# Patient Record
Sex: MALE | Race: AMERICAN INDIAN OR ALASKA NATIVE
[De-identification: names, ages, dates, MRNs, and addresses within clinical notes are randomized per-mention and may not be internally consistent; named-entity substitution may affect disease eponyms.]

---

## 2019-02-19 ENCOUNTER — HOSPITAL ENCOUNTER (EMERGENCY)
Dept: HOSPITAL 42 - ED | Age: 44
Discharge: LEFT BEFORE DISCHARGE | End: 2019-02-19
Payer: MEDICAID

## 2019-02-19 VITALS — BODY MASS INDEX: 29 KG/M2

## 2019-02-19 VITALS — HEART RATE: 72 BPM | SYSTOLIC BLOOD PRESSURE: 160 MMHG | DIASTOLIC BLOOD PRESSURE: 100 MMHG

## 2019-02-19 VITALS — RESPIRATION RATE: 18 BRPM | TEMPERATURE: 97.9 F | OXYGEN SATURATION: 100 %

## 2019-02-19 DIAGNOSIS — Z87.891: ICD-10-CM

## 2019-02-19 DIAGNOSIS — Z91.15: ICD-10-CM

## 2019-02-19 DIAGNOSIS — N18.6: ICD-10-CM

## 2019-02-19 DIAGNOSIS — Z99.2: ICD-10-CM

## 2019-02-19 DIAGNOSIS — I12.0: Primary | ICD-10-CM

## 2019-02-19 LAB
ALBUMIN SERPL-MCNC: 4.1 G/DL (ref 3–4.8)
ALBUMIN/GLOB SERPL: 1.6 {RATIO} (ref 1.1–1.8)
ALT SERPL-CCNC: 15 U/L (ref 7–56)
AST SERPL-CCNC: 18 U/L (ref 17–59)
BASOPHILS # BLD AUTO: 0.04 K/MM3 (ref 0–2)
BASOPHILS NFR BLD: 0.5 % (ref 0–3)
BUN SERPL-MCNC: 122 MG/DL (ref 7–21)
CALCIUM SERPL-MCNC: 9.2 MG/DL (ref 8.4–10.5)
EOSINOPHIL # BLD: 0.3 10*3/UL (ref 0–0.7)
EOSINOPHIL NFR BLD: 3.5 % (ref 1.5–5)
ERYTHROCYTE [DISTWIDTH] IN BLOOD BY AUTOMATED COUNT: 14.5 % (ref 11.5–14.5)
GFR NON-AFRICAN AMERICAN: 3
HGB BLD-MCNC: 9.2 G/DL (ref 14–18)
LYMPHOCYTES # BLD: 1.3 10*3/UL (ref 1.2–3.4)
LYMPHOCYTES NFR BLD AUTO: 14.2 % (ref 22–35)
MCH RBC QN AUTO: 30.3 PG (ref 25–35)
MCHC RBC AUTO-ENTMCNC: 32.9 G/DL (ref 31–37)
MCV RBC AUTO: 92.1 FL (ref 80–105)
MONOCYTES # BLD AUTO: 0.4 10*3/UL (ref 0.1–0.6)
MONOCYTES NFR BLD: 4.1 % (ref 1–6)
PLATELET # BLD: 318 10^3/UL (ref 120–450)
PMV BLD AUTO: 9.2 FL (ref 7–11)
RBC # BLD AUTO: 3.04 10^6/UL (ref 3.5–6.1)
WBC # BLD AUTO: 8.9 10^3/UL (ref 4.5–11)

## 2019-02-19 NOTE — RAD
Date of service: 



02/19/2019



HISTORY:

 r/o CHF 



COMPARISON:

No prior. 



FINDINGS:



LUNGS:

No active pulmonary disease.



PLEURA:

No significant pleural effusion identified, no pneumothorax apparent.



CARDIOVASCULAR:

No atherosclerotic calcification present



Cardiomegaly.  No evidence of acute, significant cardiovascular 

disease. 



OSSEOUS STRUCTURES:

No significant abnormalities.



VISUALIZED UPPER ABDOMEN:

Normal.



OTHER FINDINGS:

None.



IMPRESSION:

No active disease.

## 2019-02-19 NOTE — CP.PCM.HP
<Cj Ojeda - Last Filed: 02/19/19 18:24>





History of Present Illness





- History of Present Illness


History of Present Illness: 


Cj Ojeda DO, PGY-1 Hospitalist Admission History and Physical for Dr. Verde 


CC: missed dialysis


HPI: Patient is a 44 year old male with PMH of HTN and ESRD (T/Th/S dialysis) 

who presents for concern of worsening b/l LE swelling, fatigue, and SOB with 

exertion since this AM. Patient states that he has not had a dialysis session 

since 2/9/19. He previously went to dialysis in [facility] but has now moved 

here. He states he has all of his paperwork from [facility] but has not been able to

set up with a center yet. Otherwise he denies fever/chills, CP, SOB at rest, 

cough, nausea/vomiting/abdominal pain, peripheral numbness/tingling, or urinary 

complaints. 





PMD: previously had PMD in [place] but has no PMD here


Past Medical Hx: HTN and ESRD (T/Th/S dialysis)


Past Surgical Hx: L AV fistula for dialysis


Allergies: NKA


Home medications: Norvasc 10 mg daily, 


Family Hx: father had CKD at early age


Social Hx: admits to prior cigarette smoking for about one year and prior social

alcohol use but has stopped due to ESRD. Denies illicit drug use


Pharmacy: previously went to [facility] in [place]








Present on Admission





- Present on Admission


Any Indicators Present on Admission: No


History of DVT/PE: No


History of Uncontrolled Diabetes: No


Urinary Catheter: No


Decubitus Ulcer Present: No





Review of Systems





- Constitutional


Constitutional: absent: Chills, Fever





- EENT


Eyes: absent: Change in Vision, Discharge





- Cardiovascular


Cardiovascular: Dyspnea on Exertion.  absent: Chest Pain, Chest Pain at Rest, 

Chest Pain with Activity, Orthopnea, Palpitations





- Respiratory


Respiratory: Dyspnea on Exertion.  absent: Cough, Dyspnea





- Gastrointestinal


Gastrointestinal: absent: Abdominal Pain, Nausea, Vomiting





- Genitourinary


Genitourinary: absent: Change in Urinary Stream, Difficulty Urinating





- Neurological


Neurological: absent: Dizziness, Numbness, Tingling





Past Patient History





- Infectious Disease


Hx of Infectious Diseases: None





- Past Social History


Smoking Status: Never Smoked





- CARDIAC


Hx Cardiac Disorders: Yes


Hx Hypertension: Yes





- PULMONARY


Hx Respiratory Disorders: No





- NEUROLOGICAL


Hx Neurological Disorder: No





- HEENT


Hx HEENT Problems: No





- RENAL


Hx Chronic Kidney Disease: Yes


Hx Dialysis: Yes (Tues/Thur/Sat)


Date of Last Dialysis Treatment: 02/12/19


Hx Renal Failure: Yes





- ENDOCRINE/METABOLIC


Hx Endocrine Disorders: No





- HEMATOLOGICAL/ONCOLOGICAL


Hx Blood Disorders: No





- INTEGUMENTARY


Hx Dermatological Problems: No





- MUSCULOSKELETAL/RHEUMATOLOGICAL


Hx Musculoskeletal Disorders: No





- GASTROINTESTINAL


Hx Gastrointestinal Disorders: No





- GENITOURINARY/GYNECOLOGICAL


Hx Genitourinary Disorders: No





- PSYCHIATRIC


Hx Psychophysiologic Disorder: No


Hx Substance Use: No





- SURGICAL HISTORY


Hx Arteriovenous Shunt: Yes





Meds


Allergies/Adverse Reactions: 


                                    Allergies











Allergy/AdvReac Type Severity Reaction Status Date / Time


 


No Known Allergies Allergy   Verified 02/19/19 15:15














Physical Exam





- Constitutional


Appears: Non-toxic, No Acute Distress





- Head Exam


Head Exam: ATRAUMATIC, NORMOCEPHALIC





- Eye Exam


Eye Exam: EOMI, PERRL





- ENT Exam


ENT Exam: Mucous Membranes Moist





- Neck Exam


Neck exam: Positive for: Full Rom, Normal Inspection





- Respiratory Exam


Respiratory Exam: Clear to Auscultation Bilateral, NORMAL BREATHING PATTERN.  

absent: Rales, Rhonchi, Wheezes





- Cardiovascular Exam


Cardiovascular Exam: REGULAR RHYTHM, RRR, +S1, +S2.  absent: Diastolic murmur, 

Gallop, Rubs, Systolic Murmur





- GI/Abdominal Exam


GI & Abdominal Exam: Normal Bowel Sounds, Soft.  absent: Guarding, Tenderness





- Extremities Exam


Extremities exam: Positive for: full ROM, normal capillary refill, pedal edema 

(2+ b/l), pedal pulses present





- Back Exam


Back exam: NORMAL INSPECTION





- Neurological Exam


Neurological exam: Alert, Oriented x3





- Psychiatric Exam


Psychiatric exam: Normal Affect, Normal Mood





- Skin


Skin Exam: Dry, Intact, Warm





Results





- Vital Signs


Recent Vital Signs: 





                                Last Vital Signs











Temp  97.9 F   02/19/19 15:18


 


Pulse  72   02/19/19 17:20


 


Resp  18   02/19/19 16:43


 


BP  160/100 H  02/19/19 17:20


 


Pulse Ox  100   02/19/19 16:43














- Labs


Result Diagrams: 


                                 02/19/19 16:30





                                 02/19/19 16:30


Labs: 





                         Laboratory Results - last 24 hr











  02/19/19 02/19/19





  16:30 16:30


 


WBC  8.9 


 


RBC  3.04 L 


 


Hgb  9.2 L 


 


Hct  28.0 L 


 


MCV  92.1 


 


MCH  30.3 


 


MCHC  32.9 


 


RDW  14.5 


 


Plt Count  318 


 


MPV  9.2 


 


Neut % (Auto)  77.7 H 


 


Lymph % (Auto)  14.2 L 


 


Mono % (Auto)  4.1 


 


Eos % (Auto)  3.5 


 


Baso % (Auto)  0.5 


 


Lymph # (Auto)  1.3 


 


Mono # (Auto)  0.4 


 


Eos # (Auto)  0.3 


 


Baso # (Auto)  0.04 


 


Absolute Neuts (auto)  6.90 H 


 


Sodium   138


 


Potassium   5.4 H


 


Chloride   107


 


Carbon Dioxide   17 L


 


Anion Gap   20


 


BUN   122 H*


 


Creatinine   19.9 H*


 


Est GFR ( Amer)   3


 


Est GFR (Non-Af Amer)   3


 


Random Glucose   158 H


 


Calcium   9.2


 


Magnesium   2.7 H


 


Total Bilirubin   0.3


 


AST   18


 


ALT   15


 


Alkaline Phosphatase   87


 


Total Protein   6.7


 


Albumin   4.1


 


Globulin   2.6


 


Albumin/Globulin Ratio   1.6














Assessment & Plan





- Assessment and Plan (Free Text)


Assessment: 





43 yo M with PMH of HTN and ESRD presents with b/l LE swelling and dyspnea on 

exertion likely 2/2 missed dialysis sessions.


Plan: 





B/l LE Edema 


Most likely 2/2 missed dialysis sessions


BUN/Cr and K consistent with missed dialysis


Arranging for dialysis tonight per Dr. Edwards


Monitor for improvement of edema, electrolytes 


Recheck all electrolytes, Mg, Phos in AM


Nephrology consult placed, all recs appreciated





HTN


Will hold anti-hypertensives tonight until after dialysis


May resume home meds in AM





DVT/GI PPX: SC Heparin/protonix


Full Code 


HHD 


Monitor on med/surg





Patient seen, examined, and plan discussed with my attending Dr. Mehreen Ojeda, D.O. 


IM Resident PGY-1 


Pager: [phone] 





<Rizwana Verde - Last Filed: 02/20/19 15:23>





Results





- Vital Signs


Recent Vital Signs: 





                                Last Vital Signs











Temp  97.9 F   02/19/19 15:18


 


Pulse  72   02/19/19 17:20


 


Resp  18   02/19/19 16:43


 


BP  160/100 H  02/19/19 17:20


 


Pulse Ox  100   02/19/19 16:43














- Labs


Result Diagrams: 


                                 02/19/19 16:30





                                 02/19/19 16:30


Labs: 





                         Laboratory Results - last 24 hr











  02/19/19 02/19/19





  16:30 16:30


 


WBC  8.9 


 


RBC  3.04 L 


 


Hgb  9.2 L 


 


Hct  28.0 L 


 


MCV  92.1 


 


MCH  30.3 


 


MCHC  32.9 


 


RDW  14.5 


 


Plt Count  318 


 


MPV  9.2 


 


Neut % (Auto)  77.7 H 


 


Lymph % (Auto)  14.2 L 


 


Mono % (Auto)  4.1 


 


Eos % (Auto)  3.5 


 


Baso % (Auto)  0.5 


 


Lymph # (Auto)  1.3 


 


Mono # (Auto)  0.4 


 


Eos # (Auto)  0.3 


 


Baso # (Auto)  0.04 


 


Absolute Neuts (auto)  6.90 H 


 


Sodium   138


 


Potassium   5.4 H


 


Chloride   107


 


Carbon Dioxide   17 L


 


Anion Gap   20


 


BUN   122 H*


 


Creatinine   19.9 H*


 


Est GFR ( Amer)   3


 


Est GFR (Non-Af Amer)   3


 


Random Glucose   158 H


 


Calcium   9.2


 


Magnesium   2.7 H


 


Total Bilirubin   0.3


 


AST   18


 


ALT   15


 


Alkaline Phosphatase   87


 


Total Protein   6.7


 


Albumin   4.1


 


Globulin   2.6


 


Albumin/Globulin Ratio   1.6














Attending/Attestation





- Attestation


I have personally seen and examined this patient.: Yes


I have fully participated in the care of the patient.: Yes


I have reviewed all pertinent clinical information: Yes


Notes (Text): 





02/20/19 15:19








Attending note;


Patient seen and examined with resident and ER.


Patient is alert and awake.


Complaining of leg swelling.


Requesting immediate dialysis.


Not in any acute distress.


Patient is demanding.





Patient is a 44 year old male with PMH of HTN and ESRD (T/Th/S dialysis) who 

presents for concern of worsening b/l LE swelling, fatigue, and SOB with 

exertion since this AM. Patient states that he has not had a dialysis session 

since 2/9/19. He previously went to dialysis in [facility] but has now moved 

here.


He did not make any arrangements for dialysis before left [facility].


 currently Demanding dialysis.





1.  End-stage renal disease on dialysis; noncompliant with follow-up due to 

recent move.


Patient did not make any arrangements with the dialysis unit.


Currently no primary care doctor.


Case discussed with nephrology in detail.


Plan for dialysis today.


2.  Uncontrolled hypertension; patient does not have any prescription/ or 

medications.





Patient will be admitted and monitored closely.


We will get  evaluation.





Addendum;


Patient eloped before dialysis.

## 2019-02-19 NOTE — CARD
--------------- APPROVED REPORT --------------





Date of service: 02/19/2019



EKG Measurement

Heart [id number]

DE [id number]

[id number]

[id number]

[id number]



<Conclusion>

Normal sinus rhythm

Minimal voltage criteria for LVH, may be normal variant

NDSTT abnormalities

Abnormal ECG

## 2019-02-19 NOTE — CP.PCM.CON
<Austin Beard - Last Filed: 02/19/19 18:45>





History of Present Illness





- History of Present Illness


History of Present Illness: 


Austin Beard PGY2 Nephrology Consult NOte for Dr. Edwards 





44 year old AA male with PMH of HTN and ESRD (T/Th/S dialysis) who presents for 

concern of worsening b/l LE swelling, fatigue, and SOB with exertion since this 

AM. Patient states that he has not had a dialysis session since 2/9/19 because 

he recently was in the process of moving from [facility] to the [facility]. He also states he has not been able to take all his medications for the 

past two days because he ran out of them. Patient denies chest pain, SOB at 

rest, cough, nausea/vomiting/abdominal pain, peripheral numbness/tingling, or 

urinary complaints. Patient also states he still makes urine. He recvives 

dialysis through AV fistule in the left arm and has been on dialysis for the 

past 7 to 8 moths and follows with nephrologist and PMD in [place]. 





PMD: does not recall name but located in [place]


Nephrologist: Does not recall name but located in [place] 





Past Medical Hx: HTN and ESRD (T/Th/S dialysis)


Past Surgical Hx: L AV fistula for dialysis


Allergies: NKA


Home medications: Norvasc 10 mg daily, carvedilol, torsemide, clonidine 


Family Hx: father had CKD at early age


Social Hx: has smoked tobacco occasionally in the past but quit since dialysis, 

denies alcohol or illicit drug use


Pharmacy:[facility] in [place]











Review of Systems





- Constitutional


Constitutional: Fatigue, Weight Gain.  absent: Chills, Fever





- Cardiovascular


Cardiovascular: Dyspnea, Palpitations, Pedal Edema.  absent: Chest Pain, Chest 

Pain at Rest, Chest Pain with Activity, Diaphoresis, Lightheadedness





- Respiratory


Respiratory: Dyspnea on Exertion.  absent: Cough, Wheezing, Pain on Inspiration,

Chest Congestion





- Gastrointestinal


Gastrointestinal: absent: Abdominal Pain, Nausea, Vomiting





- Genitourinary


Genitourinary: absent: Difficulty Urinating, Dysuria, Flank Pain, Hematuria, 

Urinary Incontinence





- Musculoskeletal


Musculoskeletal: absent: Back Pain, Muscle Weakness, Tingling





- Neurological


Neurological: absent: Disequilibrium, Dizziness, Tingling





- Endocrine


Endocrine: absent: Excessive Sweating





Past Patient History





- Infectious Disease


Hx of Infectious Diseases: None





- Past Social History


Smoking Status: Never Smoked





- CARDIAC


Hx Cardiac Disorders: Yes


Hx Hypertension: Yes





- PULMONARY


Hx Respiratory Disorders: No





- NEUROLOGICAL


Hx Neurological Disorder: No





- HEENT


Hx HEENT Problems: No





- RENAL


Hx Chronic Kidney Disease: Yes


Hx Dialysis: Yes (Tues/Thur/Sat)


Date of Last Dialysis Treatment: 02/12/19


Hx Renal Failure: Yes





- ENDOCRINE/METABOLIC


Hx Endocrine Disorders: No





- HEMATOLOGICAL/ONCOLOGICAL


Hx Blood Disorders: No





- INTEGUMENTARY


Hx Dermatological Problems: No





- MUSCULOSKELETAL/RHEUMATOLOGICAL


Hx Musculoskeletal Disorders: No





- GASTROINTESTINAL


Hx Gastrointestinal Disorders: No





- GENITOURINARY/GYNECOLOGICAL


Hx Genitourinary Disorders: No





- PSYCHIATRIC


Hx Psychophysiologic Disorder: No


Hx Substance Use: No





- SURGICAL HISTORY


Hx Arteriovenous Shunt: Yes





Meds


Allergies/Adverse Reactions: 


                                    Allergies











Allergy/AdvReac Type Severity Reaction Status Date / Time


 


No Known Allergies Allergy   Verified 02/19/19 15:15














- Medications


Medications: 


                               Current Medications





Acetaminophen (Tylenol 325mg Tab)  650 mg PO Q6H PRN


   PRN Reason: Pain, moderate (4-7)


Amlodipine Besylate (Norvasc)  10 mg PO BID DANYELL


Clonidine HCl (Catapres)  0.2 mg PO DAILY [facility]


Heparin Sodium (Porcine) (Heparin)  5,000 units SC Q8 DANYELL; Protocol


Insulin Human Lispro (Humalog Med)  0 units SC ACHS [facility]; Protocol


Pantoprazole Sodium (Protonix Ec Tab)  40 mg PO 0600 DANYELL


Torsemide (Demadex)  10 mg PO BID DANYELL











Physical Exam





- Constitutional


Appears: Non-toxic, No Acute Distress





- Head Exam


Head Exam: ATRAUMATIC, NORMAL INSPECTION, NORMOCEPHALIC





- Eye Exam


Eye Exam: Normal appearance





- ENT Exam


ENT Exam: Mucous Membranes Moist





- Respiratory Exam


Respiratory Exam: Clear to Auscultation Bilateral, NORMAL BREATHING PATTERN





- Cardiovascular Exam


Cardiovascular Exam: REGULAR RHYTHM, +S1, +S2





- GI/Abdominal Exam


GI & Abdominal Exam: Soft.  absent: Tenderness





- Extremities Exam


Extremities exam: Positive for: pedal edema, pedal pulses present.  Negative 

for: calf tenderness, tenderness


Additional comments: 


AV fistula in left arm, audible thrill, no bruit 








- Neurological Exam


Neurological exam: Alert, CN II-XII Intact, Oriented x3





- Skin


Skin Exam: Normal Color, Warm





Results





- Vital Signs


Recent Vital Signs: 


                                Last Vital Signs











Temp  97.9 F   02/19/19 15:18


 


Pulse  72   02/19/19 17:20


 


Resp  18   02/19/19 16:43


 


BP  160/100 H  02/19/19 17:20


 


Pulse Ox  100   02/19/19 16:43














- Labs


Result Diagrams: 


                                 02/19/19 16:30





                                 02/19/19 16:30


Labs: 


                         Laboratory Results - last 24 hr











  02/19/19 02/19/19





  16:30 16:30


 


WBC  8.9 


 


RBC  3.04 L 


 


Hgb  9.2 L 


 


Hct  28.0 L 


 


MCV  92.1 


 


MCH  30.3 


 


MCHC  32.9 


 


RDW  14.5 


 


Plt Count  318 


 


MPV  9.2 


 


Neut % (Auto)  77.7 H 


 


Lymph % (Auto)  14.2 L 


 


Mono % (Auto)  4.1 


 


Eos % (Auto)  3.5 


 


Baso % (Auto)  0.5 


 


Lymph # (Auto)  1.3 


 


Mono # (Auto)  0.4 


 


Eos # (Auto)  0.3 


 


Baso # (Auto)  0.04 


 


Absolute Neuts (auto)  6.90 H 


 


Sodium   138


 


Potassium   5.4 H


 


Chloride   107


 


Carbon Dioxide   17 L


 


Anion Gap   20


 


BUN   122 H*


 


Creatinine   19.9 H*


 


Est GFR ( Amer)   3


 


Est GFR (Non-Af Amer)   3


 


Random Glucose   158 H


 


Calcium   9.2


 


Magnesium   2.7 H


 


Total Bilirubin   0.3


 


AST   18


 


ALT   15


 


Alkaline Phosphatase   87


 


Total Protein   6.7


 


Albumin   4.1


 


Globulin   2.6


 


Albumin/Globulin Ratio   1.6














Assessment & Plan





- Assessment and Plan (Free Text)


Assessment: 


44 year old AA male with PMH of HTN and ESRD (T/Th/S dialysis) who presents for 

concern of worsening b/l LE swelling, fatigue, and SOB with exertion since this 

AM. Patient has not had HD since 2/9/19 and has not taken his blood pressure 

medications for the pat two days. 





Plan: 


1. ESRD on HD 


-patient has not had dialysis since 2/9/19


-Cr 19.9, 


-K of 5.4


-Na within normal limits 


-EKG was normal sinus rhythm, unofficial read 


-Chest xray does not show significant congestion, will wait for official read


-lower extremity edema present 


-will receive emergent HD


-continue to monitor 


-repeat labs in AM





2. HTN Chronic


-/65 on admission 


-given clonidine .2mg, norvasc 10mg in ED with improvement in BP to 160/100


-will resume home meds tomorrow


-avoid dropping blood pressure too rapidly before HD session


-continue to monitor





3. Elevated Blood Sugar


-BS of 158


-insulin sliding scale


-lipid panel pending 


-hemoglobin A1C pending 











<Josiah Edwards - Last Filed: 02/20/19 13:20>





Results





- Vital Signs


Recent Vital Signs: 


                                Last Vital Signs











Temp  97.9 F   02/19/19 15:18


 


Pulse  72   02/19/19 17:20


 


Resp  18   02/19/19 16:43


 


BP  160/100 H  02/19/19 17:20


 


Pulse Ox  100   02/19/19 16:43














- Labs


Result Diagrams: 


                                 02/19/19 16:30





                                 02/19/19 16:30


Labs: 


                         Laboratory Results - last 24 hr











  02/19/19 02/19/19





  16:30 16:30


 


WBC  8.9 


 


RBC  3.04 L 


 


Hgb  9.2 L 


 


Hct  28.0 L 


 


MCV  92.1 


 


MCH  30.3 


 


MCHC  32.9 


 


RDW  14.5 


 


Plt Count  318 


 


MPV  9.2 


 


Neut % (Auto)  77.7 H 


 


Lymph % (Auto)  14.2 L 


 


Mono % (Auto)  4.1 


 


Eos % (Auto)  3.5 


 


Baso % (Auto)  0.5 


 


Lymph # (Auto)  1.3 


 


Mono # (Auto)  0.4 


 


Eos # (Auto)  0.3 


 


Baso # (Auto)  0.04 


 


Absolute Neuts (auto)  6.90 H 


 


Sodium   138


 


Potassium   5.4 H


 


Chloride   107


 


Carbon Dioxide   17 L


 


Anion Gap   20


 


BUN   122 H*


 


Creatinine   19.9 H*


 


Est GFR ( Amer)   3


 


Est GFR (Non-Af Amer)   3


 


Random Glucose   158 H


 


Calcium   9.2


 


Magnesium   2.7 H


 


Total Bilirubin   0.3


 


AST   18


 


ALT   15


 


Alkaline Phosphatase   87


 


Total Protein   6.7


 


Albumin   4.1


 


Globulin   2.6


 


Albumin/Globulin Ratio   1.6














Attending/Attestation





- Attestation


I have personally seen and examined this patient.: Yes


I have fully participated in the care of the patient.: Yes


I have reviewed all pertinent clinical information: Yes


Notes (Text): 





Patient seen and examined; I agree with the resident's note as above with the 

following additions/edits:





43 yo M w/ pmh of htn and ESRD on HD (since past 8 months, at [facility]),

presented to ED with leg swelling and missed HD; nephrology being consulted for 

need for urgent HD;





Patient reports last HD treatment 10 days ago; had to move to this area due to 

some family issues; currently denies shortness of breath though legs much more 

edematous than usual; makes normal amount of urine and takes diuretics; has been

trying to have decreased PO intake in order to avoid toxin buildup in the 

setting of missed HD; otherwise says his appetite is well and is currently 

hungry;





Lungs clear on exam and by CXR; 2+ b/l lower ext edema noted though patient in 

no resp distress and not requiring supplemental FIO2; minimal asterisix;





Patient also with uncontrolled htn; will continue home meds but will benefit 

from UF on HD;





Anemia of CKD; will need to check iron studies;





Patient seen this evening in ED; was told that he would have to wait about 1-2 

hrs to start HD due to logistical issues and that there's no absolute emergency 

to start HD stat; nevertheless, he should have HD treatment tonight to avoid an 

emergency later; patient, however, said that he couldn't wait and needed to tend

to his young daughter; I informed him although he has some residual renal 

function that has allowed him to survive for this many days without HD, skipping

further HD treatment was a dangerous decision and against medical advice; I told

him of the risk of sudden death; I was later informed by HD nurse that patient 

eloped from ED; we will reach out to him to enourage him to get HD treatment 

ASAP.

## 2019-02-19 NOTE — ED PDOC
Arrival/HPI





- General


Chief Complaint: Medical Clearance


Historian: Patient





- History of Present Illness


Narrative History of Present Illness (Text): 





02/19/19 15:47


42 y/o M, with past medical history of hypertension, and ESRD on hemodialysis 

(Tues/Thur/Sat), presents to the ED for emergent dialysis today. Patient states 

his last dialysis was last week on 02/12/19 without any complication, however 

was unable to receive his treatment this week secondary to recent relocation. 

Patient additionally informs missing his routine medications for last two days. 

Patient currently denies any somatic complaints. Patient denies any fevers, 

chills, headache, dizziness, chest pain, shortness of breath, dyspnea on 

exertion, cough, abdominal pain, nausea, vomiting, diarrhea, back pain, neck 

pain, or any other complaints.


 


Time/Duration: Prior to Arrival


Symptom Onset: Gradual


Symptom Course: Unchanged


Activities at Onset: Light


Context: Home





Past Medical History





- Provider Review


Nursing Documentation Reviewed: Yes





- Infectious Disease


Hx of Infectious Diseases: None





- Cardiac


Hx Cardiac Disorders: Yes


Hx Hypertension: Yes





- Pulmonary


Hx Respiratory Disorders: No





- Neurological


Hx Neurological Disorder: No





- HEENT


Hx HEENT Disorder: No





- Renal


Hx Renal Disorder: Yes


Hx Dialysis: Yes (Tues/Thur/Sat)


Date of Last Dialysis Treatment: 02/12/19


Hx Renal Failure: Yes





- Endocrine/Metabolic


Hx Endocrine Disorders: No





- Hematological/Oncological


Hx Blood Disorders: No





- Integumentary


Hx Dermatological Disorder: No





- Musculoskeletal/Rheumatological


Hx Musculoskeletal Disorders: No





- Gastrointestinal


Hx Gastrointestinal Disorders: No





- Genitourinary/Gynecological


Hx Genitourinary Disorders: No





- Psychiatric


Hx Psychophysiologic Disorder: No


Hx Substance Use: No





- Surgical History


Hx Arteriovenous Shunt: Yes





Family/Social History





- Physician Review


Nursing Documentation Reviewed: Yes


Family/Social History: Unknown Family HX


Smoking Status: Never Smoked


Hx Alcohol Use: No


Hx Substance Use: No





Allergies/Home Meds


Allergies/Adverse Reactions: 


Allergies





No Known Allergies Allergy (Verified 02/19/19 15:15)


   








Home Medications: 


                                    Home Meds











 Medication  Instructions  Recorded  Confirmed


 


Torsemide [Demadex] 10 mg PO BID 02/19/19 02/19/19


 


amLODIPine [Norvasc] 10 mg PO BID 02/19/19 02/19/19


 


cloNIDine [Catapres] 0.2 mg PO DAILY 02/19/19 02/19/19














Review of Systems





- Physician Review


All systems were reviewed & negative as marked: Yes





- Review of Systems


Constitutional: absent: Fevers


Respiratory: absent: SOB, Cough


Cardiovascular: absent: Chest Pain


Gastrointestinal: absent: Abdominal Pain, Diarrhea, Nausea, Vomiting


Genitourinary Male: absent: Dysuria, Urinary Output Changes


Musculoskeletal: absent: Back Pain, Neck Pain


Skin: absent: Rash


Neurological: absent: Headache, Dizziness





Physical Exam


Vital Signs Reviewed: Yes





Vital Signs











  Temp Pulse Resp BP Pulse Ox


 


 02/19/19 15:18  97.9 F  67  18  181/65 H  100











Temperature: Afebrile


Blood Pressure: Hypertensive


Pulse: Regular


Respiratory Rate: Normal


Appearance: Positive for: Well-Appearing, Non-Toxic, Comfortable


Pain Distress: None


Mental Status: Positive for: Alert and Oriented X 3





Medical Decision Making


ED Course and Treatment: 





02/19/19 15:52


Impression:


43 year old male presents to the ED for emergent dialysis today.





Differential Diagnosis included but are not limited to:  





Plan:


-- Labs


-- Reassess and disposition





Prior Visits:


Notes and results from previous visits were reviewed. 





Progress Notes:





02/19/19 16:43 


Spoke to Dr. Edwards, who will speak to dialysis team. 





02/19/19 17:13 


Spoke to Dr. Trimble who accepts the patient 





02/19/19 19:42


Chest X-Ray shows: 


FINDINGS:


LUNGS:


No active pulmonary disease.


PLEURA:


No significant pleural effusion identified, no pneumothorax apparent.


CARDIOVASCULAR:


No atherosclerotic calcification present


Cardiomegaly.  No evidence of acute, significant cardiovascular disease. 


OSSEOUS STRUCTURES:


No significant abnormalities.


VISUALIZED UPPER ABDOMEN:


Normal.


OTHER FINDINGS:


None.


IMPRESSION:


No active disease.








- RAD Interpretation


: Radiologist





- EKG Interpretation


EKG Interpretation (Text): 





02/19/19 18:35


Reviewed EKG, shows: NSR at 66 BPM. LVH present. Peaked T waves seen in 

precordial leads. 





Interpreted by ED Physician: Yes


Type: 12 lead EKG





- Scribe Statement


The provider has reviewed the documentation as recorded by the Scribe


Rachel Hoover.





All medical record entries made by the Scribe were at my direction and 

personally dictated by me. I have reviewed the chart and agree that the record 

accurately reflects my personal performance of the history, physical exam, 

medical decision making, and the department course for this patient. I have also

 personally directed, reviewed, and agree with the discharge instructions and 

disposition.








Disposition/Present on Arrival





- Present on Arrival


History of DVT/PE: No


History of Uncontrolled Diabetes: No


Urinary Catheter: No


History of Decub. Ulcer: No


History Surgical Site Infection Following: None





- Disposition


Referrals: 


PCP,NO [Primary Care Provider] - Follow up with primary

## 2019-02-20 NOTE — CP.PCM.DIS
Provider





- Provider


Primary care physician: 


NO PRIMARY CARE PROVIDER





Consults: 








02/19/19 17:17


Physician Consult Stat 


   Comment: 


   Consulting Provider: Josiah Edwards


   Consulting Physician: Josiah Edwards


   Reason for Consult: ESRD needing emergenc dialysis











Time Spent in preparation of Discharge (in minutes): 35





Diagnosis





- Discharge Diagnosis


(1) ESRD needing dialysis


Status: Chronic   





Hospital Course





- Lab Results


Lab Results: 


                             Most Recent Lab Values











WBC  8.9 10^3/uL (4.5-11.0)   02/19/19  16:30    


 


RBC  3.04 10^6/uL (3.5-6.1)  L  02/19/19  16:30    


 


Hgb  9.2 g/dL (14.0-18.0)  L  02/19/19  16:30    


 


Hct  28.0 % (42.0-52.0)  L  02/19/19  16:30    


 


MCV  92.1 fl (80.0-105.0)   02/19/19  16:30    


 


MCH  30.3 pg (25.0-35.0)   02/19/19  16:30    


 


MCHC  32.9 g/dl (31.0-37.0)   02/19/19  16:30    


 


RDW  14.5 % (11.5-14.5)   02/19/19  16:30    


 


Plt Count  318 10^3/uL (120.0-450.0)   02/19/19  16:30    


 


MPV  9.2 fl (7.0-11.0)   02/19/19  16:30    


 


Neut % (Auto)  77.7 % (50.0-68.0)  H  02/19/19  16:30    


 


Lymph % (Auto)  14.2 % (22.0-35.0)  L  02/19/19  16:30    


 


Mono % (Auto)  4.1 % (1.0-6.0)   02/19/19  16:30    


 


Eos % (Auto)  3.5 % (1.5-5.0)   02/19/19  16:30    


 


Baso % (Auto)  0.5 % (0.0-3.0)   02/19/19  16:30    


 


Lymph # (Auto)  1.3  (1.2-3.4)   02/19/19  16:30    


 


Mono # (Auto)  0.4  (0.1-0.6)   02/19/19  16:30    


 


Eos # (Auto)  0.3  (0.0-0.7)   02/19/19  16:30    


 


Baso # (Auto)  0.04 K/mm3 (0.0-2.0)   02/19/19  16:30    


 


Absolute Neuts (auto)  6.90  (1.4-6.5)  H  02/19/19  16:30    


 


Sodium  138 mmol/L (132-148)   02/19/19  16:30    


 


Potassium  5.4 mmol/L (3.6-5.0)  H  02/19/19  16:30    


 


Chloride  107 mmol/L ()   02/19/19  16:30    


 


Carbon Dioxide  17 mmol/L (21-33)  L  02/19/19  16:30    


 


Anion Gap  20  (10-20)   02/19/19  16:30    


 


BUN  122 mg/dL (7-21)  H*  02/19/19  16:30    


 


Creatinine  19.9 mg/dl (0.8-1.5)  H*  02/19/19  16:30    


 


Est GFR ( Amer)  3   02/19/19  16:30    


 


Est GFR (Non-Af Amer)  3   02/19/19  16:30    


 


Random Glucose  158 mg/dL ()  H  02/19/19  16:30    


 


Calcium  9.2 mg/dL (8.4-10.5)   02/19/19  16:30    


 


Magnesium  2.7 mg/dL (1.7-2.2)  H  02/19/19  16:30    


 


Total Bilirubin  0.3 mg/dL (0.2-1.3)   02/19/19  16:30    


 


AST  18 U/L (17-59)   02/19/19  16:30    


 


ALT  15 U/L (7-56)   02/19/19  16:30    


 


Alkaline Phosphatase  87 U/L ()   02/19/19  16:30    


 


Total Protein  6.7 g/dL (5.8-8.3)   02/19/19  16:30    


 


Albumin  4.1 g/dL (3.0-4.8)   02/19/19  16:30    


 


Globulin  2.6 gm/dL  02/19/19  16:30    


 


Albumin/Globulin Ratio  1.6  (1.1-1.8)   02/19/19  16:30    














- Hospital Course


Hospital Course: 





Patient was scheduled for dialysis and consent obtained per Dr. Edwards but 

eloped prior to receiving dialysis. Patient eloped from ED.





Discharge Exam





- Head Exam


Head Exam: ATRAUMATIC, NORMAL INSPECTION, NORMOCEPHALIC


Additional comments: 





unable to obtain, patient eloped





Discharge Plan





- Follow Up Plan


Condition: GOOD


Disposition: ELOPEMENT - ER ONLY


Referrals: 


PCP,NO [Primary Care Provider] - Follow up with primary

## 2019-04-17 ENCOUNTER — HOSPITAL ENCOUNTER (OUTPATIENT)
Dept: HOSPITAL 42 - ED | Age: 44
Setting detail: OBSERVATION
Discharge: LEFT BEFORE DISCHARGE | End: 2019-04-17
Attending: INTERNAL MEDICINE | Admitting: INTERNAL MEDICINE
Payer: MEDICAID

## 2019-04-17 VITALS
DIASTOLIC BLOOD PRESSURE: 83 MMHG | OXYGEN SATURATION: 98 % | HEART RATE: 73 BPM | SYSTOLIC BLOOD PRESSURE: 146 MMHG | TEMPERATURE: 97.6 F | RESPIRATION RATE: 19 BRPM

## 2019-04-17 VITALS — BODY MASS INDEX: 30.9 KG/M2

## 2019-04-17 DIAGNOSIS — N18.6: ICD-10-CM

## 2019-04-17 DIAGNOSIS — D63.1: ICD-10-CM

## 2019-04-17 DIAGNOSIS — I12.0: Primary | ICD-10-CM

## 2019-04-17 DIAGNOSIS — Z87.891: ICD-10-CM

## 2019-04-17 DIAGNOSIS — Z91.15: ICD-10-CM

## 2019-04-17 DIAGNOSIS — E87.5: ICD-10-CM

## 2019-04-17 DIAGNOSIS — Z99.2: ICD-10-CM

## 2019-04-17 DIAGNOSIS — M89.9: ICD-10-CM

## 2019-04-17 DIAGNOSIS — Z91.19: ICD-10-CM

## 2019-04-17 DIAGNOSIS — E83.39: ICD-10-CM

## 2019-04-17 LAB
ALBUMIN SERPL-MCNC: 4.2 G/DL (ref 3–4.8)
ALBUMIN/GLOB SERPL: 1.6 {RATIO} (ref 1.1–1.8)
ALT SERPL-CCNC: 26 U/L (ref 7–56)
AST SERPL-CCNC: 30 U/L (ref 17–59)
BASOPHILS # BLD AUTO: 0.05 K/MM3 (ref 0–2)
BASOPHILS NFR BLD: 0.7 % (ref 0–3)
BUN SERPL-MCNC: 98 MG/DL (ref 7–21)
CALCIUM SERPL-MCNC: 9.1 MG/DL (ref 8.4–10.5)
EOSINOPHIL # BLD: 0.4 10*3/UL (ref 0–0.7)
EOSINOPHIL NFR BLD: 5.3 % (ref 1.5–5)
ERYTHROCYTE [DISTWIDTH] IN BLOOD BY AUTOMATED COUNT: 15.5 % (ref 11.5–14.5)
GFR NON-AFRICAN AMERICAN: 3
HGB BLD-MCNC: 10.9 G/DL (ref 14–18)
LYMPHOCYTES # BLD: 1.4 10*3/UL (ref 1.2–3.4)
LYMPHOCYTES NFR BLD AUTO: 18.9 % (ref 22–35)
MCH RBC QN AUTO: 30.6 PG (ref 25–35)
MCHC RBC AUTO-ENTMCNC: 32.6 G/DL (ref 31–37)
MCV RBC AUTO: 93.8 FL (ref 80–105)
MONOCYTES # BLD AUTO: 0.3 10*3/UL (ref 0.1–0.6)
MONOCYTES NFR BLD: 3.9 % (ref 1–6)
PLATELET # BLD: 253 10^3/UL (ref 120–450)
PMV BLD AUTO: 9.2 FL (ref 7–11)
RBC # BLD AUTO: 3.56 10^6/UL (ref 3.5–6.1)
WBC # BLD AUTO: 7.5 10^3/UL (ref 4.5–11)

## 2019-04-17 PROCEDURE — 71045 X-RAY EXAM CHEST 1 VIEW: CPT

## 2019-04-17 PROCEDURE — 84100 ASSAY OF PHOSPHORUS: CPT

## 2019-04-17 PROCEDURE — 83735 ASSAY OF MAGNESIUM: CPT

## 2019-04-17 PROCEDURE — 90935 HEMODIALYSIS ONE EVALUATION: CPT

## 2019-04-17 PROCEDURE — 85025 COMPLETE CBC W/AUTO DIFF WBC: CPT

## 2019-04-17 PROCEDURE — 99282 EMERGENCY DEPT VISIT SF MDM: CPT

## 2019-04-17 PROCEDURE — 80053 COMPREHEN METABOLIC PANEL: CPT

## 2019-04-17 NOTE — RAD
HISTORY:

 chf 



COMPARISON:

None available. 



TECHNIQUE:

Chest, one view.



FINDINGS:





LUNGS:

No focal consolidation.



Please note that chest x-ray has limited sensitivity for the 

detection of pulmonary masses.



PLEURA:

No significant pleural effusion identified. No definite pneumothorax .



CARDIOVASCULAR:

Cardiomegaly.  Prominent mediastinum may be exaggerated by ectatic 

aorta and patient obliquity.  No significant atherosclerotic 

calcification present. 



OSSEOUS STRUCTURES:

Degenerative changes of the spine.



VISUALIZED UPPER ABDOMEN:

Unremarkable.



OTHER FINDINGS:

None.



IMPRESSION:

Cardiomegaly.  Prominent mediastinum may be exaggerated by ectatic 

aorta and patient obliquity.

## 2019-04-17 NOTE — ED PDOC
Arrival/HPI





- General


Chief Complaint: Medical Clearance


Time Seen by Provider: 04/17/19 15:59


Historian: Patient


EM Caveat: Respiratory Distress





- History of Present Illness


Narrative History of Present Illness (Text): 





04/17/19 15:59 


Pt is a 43 y/o male, with a past medical history of ESRD on dialysis (M/W/F) and

hypertension, who presents to the emergency department for evaluation of 

dialysis graft on left upper arm. Patient informs [facility] dialysis informed his 

dialysis graft is clogged. Patient's last dialysis was last Friday 3/12/19. 

Patient informs missing dialysis on 3/15/19 as well as today. Patient does not 

have a PMD. Patient denies any fevers, chills, headache, dizziness, chest pain, 

shortness of breath, dyspnea on exertion, cough, abdominal pain, nausea, 

vomiting, diarrhea, dysuria, hematuria, urinary frequency changes, back pain, 

neck pain, or any other complaint.








Symptom Onset: Gradual


Symptom Course: Unchanged


Activities at Onset: Light


Context: Other (Dialysis Center )





Past Medical History





- Provider Review


Nursing Documentation Reviewed: Yes





- Infectious Disease


Hx of Infectious Diseases: None





- Cardiac


Hx Cardiac Disorders: No





- Pulmonary


Hx Respiratory Disorders: No





- Neurological


Hx Neurological Disorder: No





- HEENT


Hx HEENT Disorder: No





- Renal


Hx Renal Disorder: Yes


Hx Dialysis: Yes (Tues/Thur/Sat)


Date of Last Dialysis Treatment: 04/12/19


Hx Renal Failure: Yes





- Endocrine/Metabolic


Hx Endocrine Disorders: No





- Hematological/Oncological


Hx Blood Disorders: Yes


Hx Blood Transfusions: Yes (FEW MONTHS AGO)





- Integumentary


Hx Dermatological Disorder: No





- Musculoskeletal/Rheumatological


Hx Musculoskeletal Disorders: No





- Gastrointestinal


Hx Gastrointestinal Disorders: No





- Genitourinary/Gynecological


Hx Genitourinary Disorders: No





- Psychiatric


Hx Psychophysiologic Disorder: No


Hx Substance Use: No





- Surgical History


Hx Arteriovenous Shunt: Yes





- Anesthesia


Hx Anesthesia Reactions: No





- Suicidal Assessment


Feels Threatened In Home Enviroment: No





Family/Social History





- Physician Review


Nursing Documentation Reviewed: Yes


Family/Social History: Unknown Family HX


Smoking Status: Never Smoked


Hx Alcohol Use: No


Hx Substance Use: No





Allergies/Home Meds


Allergies/Adverse Reactions: 


Allergies





No Known Allergies Allergy (Verified 04/17/19 15:58)


   








Home Medications: 


                                    Home Meds











 Medication  Instructions  Recorded  Confirmed


 


amLODIPine [Norvasc] 10 mg PO QPM 02/19/19 04/17/19


 


cloNIDine [Catapres] 0.2 mg PO BID 02/19/19 04/17/19


 


Carvedilol [Coreg] 25 mg PO BID 03/22/19 04/17/19














Review of Systems





- Physician Review


All systems were reviewed & negative as marked: Yes





- Review of Systems


Constitutional: absent: Fevers, Other (chills)


Respiratory: absent: SOB, Cough


Cardiovascular: absent: Chest Pain, UMANA


Gastrointestinal: absent: Abdominal Pain, Diarrhea, Nausea, Vomiting


Genitourinary Male: absent: Dysuria, Frequency (frequency changes), Hematuria


Musculoskeletal: absent: Back Pain, Neck Pain


Neurological: absent: Headache, Dizziness





Physical Exam





- Physical Exam


Narrative Physical Exam (Text): 





04/17/19 15:59 


Gen: VS reviewed, alert, well developed, well nourished, nontoxic, mild 

distress.


ENT: normal pharynx.


Eye: EOMI, PERRL.


Neck: no JVD, supple, no adenopathy.


CV: regular rate, regular rhythm, no rubs, no murmur, no gallops, S1, S2, pulses

 equal and strong.


Pulm: no distress, clear to auscultation, no wheeze, no rhonchi, breath sounds 

equal, no rales.


Abd: soft, nontender, no guarding, no rebound, no rigidity, normal bowel sounds.


Ext: positive thrill on left upper arm. no edema.


Skin: good color, no rash, no cyanosis.


Psych: responds appropriately to questions, normal affect.


Neuro: oriented x 3, CN2-12 intact grossly, motor intact, sensation intact. 


Vital Signs Reviewed: Yes





Vital Signs











  Temp Pulse Resp BP Pulse Ox


 


 04/17/19 15:57  97.6 F  73  19  146/83  98











Temperature: Afebrile


Blood Pressure: Normal


Pulse: Regular


Respiratory Rate: Normal


Appearance: Positive for: Well-Appearing, Non-Toxic, Comfortable


Pain Distress: None


Mental Status: Positive for: Alert and Oriented X 3





Medical Decision Making


ED Course and Treatment: 





04/17/19 17:49


admit accepted by dr. melchor to the hospitalist service. patient to be admitted 

for HD, hyperkalemic. being that the patient has severely abnormal kidney 

function and already exhibits elevations in potassium, patient is at risk for 

severe hyperkalemia. will admit for HD and dr. cruz will assume care for HD.


04/17/19 17:51


patient refusing ekg





- Scribe Statement


The provider has reviewed the documentation as recorded by the Scribe


Frantz Cervantes





All medical record entries made by the Scribe were at my direction and 

personally dictated by me. I have reviewed the chart and agree that the record 

accurately reflects my personal performance of the history, physical exam, 

medical decision making, and the department course for this patient. I have also

 personally directed, reviewed, and agree with the discharge instructions and 

disposition. 








Disposition/Present on Arrival





- Present on Arrival


Any Indicators Present on Arrival: No


History of DVT/PE: No


History of Uncontrolled Diabetes: No


Urinary Catheter: No


History of Decub. Ulcer: No


History Surgical Site Infection Following: None





- Disposition


Have Diagnosis and Disposition been Completed?: Yes


Diagnosis: 


 Renal failure, Hyperkalemia





Disposition: HOSPITALIZED


Disposition Time: 17:50


Patient Plan: Admission


Condition: STABLE


Forms:  [employer] (English)

## 2019-04-17 NOTE — CP.PCM.HP
<MaryseAram coreas - Last Filed: 04/17/19 21:54>





History of Present Illness





- History of Present Illness


History of Present Illness: 


PGY-1 Medicine H&P for Dr. Lucero





CC: missed dialysis





HPI:


Patient is a 44 year old male with PMH of HTN and ESRD (M/W/F dialysis) who 

presents for fatigue after missing a hemodialysis session. He states that his 

last dialysis session was last Friday. Patient states that he was unable to get 

dialysis this past Monday due to his AV fistula not working. He other wise has 

no other complaints. [place] [facility] [place]

[place]. He denies fevers, chills, shortness of breath, chest pain, abdominal 

pain, nausea, vomiting, diarrhea, or urinary symptoms.


Of note, nurse contacted his hemodialysis center and states that the patient is 

non-compliant and often skips his scheduled dialysis. There is nothing wrong 

with patient's AV fistula and he completed a session without issues last Friday.







12 system ROS reviewed and negative except mentioned in HPI.





PMD: denies


Past Medical Hx: HTN and ESRD (M/W/F dialysis)


Past Surgical Hx: L AV fistula for dialysis


Allergies: NKDA


Home medications: see MAR


Family Hx: father had CKD at early age


Social Hx: admits to prior cigarette smoking for about one year and prior social

alcohol use but has stopped due to ESRD. Denies illicit drug use


Pharmacy: [facility]








Present on Admission





- Present on Admission


Any Indicators Present on Admission: No


History of DVT/PE: No


History of Uncontrolled Diabetes: No


Urinary Catheter: No


Decubitus Ulcer Present: No





Past Patient History





- Infectious Disease


Hx of Infectious Diseases: None





- Past Social History


Smoking Status: Never Smoked





- CARDIAC


Hx Cardiac Disorders: No





- PULMONARY


Hx Respiratory Disorders: No





- NEUROLOGICAL


Hx Neurological Disorder: No





- HEENT


Hx HEENT Problems: No





- RENAL


Hx Chronic Kidney Disease: Yes


Hx Dialysis: Yes (Tues/Thur/Sat)


Date of Last Dialysis Treatment: 04/12/19


Hx Renal Failure: Yes





- ENDOCRINE/METABOLIC


Hx Endocrine Disorders: No





- HEMATOLOGICAL/ONCOLOGICAL


Hx Blood Disorders: Yes


Hx Blood Transfusions: Yes (FEW MONTHS AGO)





- INTEGUMENTARY


Hx Dermatological Problems: No





- MUSCULOSKELETAL/RHEUMATOLOGICAL


Hx Musculoskeletal Disorders: No





- GASTROINTESTINAL


Hx Gastrointestinal Disorders: No





- GENITOURINARY/GYNECOLOGICAL


Hx Genitourinary Disorders: No





- PSYCHIATRIC


Hx Psychophysiologic Disorder: No


Hx Substance Use: No





- SURGICAL HISTORY


Hx Arteriovenous Shunt: Yes





- ANESTHESIA


Hx Anesthesia Reactions: No





Meds


Allergies/Adverse Reactions: 


                                    Allergies











Allergy/AdvReac Type Severity Reaction Status Date / Time


 


No Known Allergies Allergy   Verified 04/17/19 15:58














Physical Exam





- Constitutional


Appears: Well, Non-toxic, No Acute Distress





- Head Exam


Head Exam: ATRAUMATIC, NORMAL INSPECTION





- Eye Exam


Eye Exam: EOMI, Normal appearance





- ENT Exam


ENT Exam: Mucous Membranes Moist





- Neck Exam


Neck exam: Positive for: Normal Inspection





- Respiratory Exam


Respiratory Exam: Clear to Auscultation Bilateral, NORMAL BREATHING PATTERN.  

absent: Rales, Rhonchi, Wheezes, Respiratory Distress





- Cardiovascular Exam


Cardiovascular Exam: REGULAR RHYTHM, +S1, +S2.  absent: Gallop, Rubs, Systolic 

Murmur





- GI/Abdominal Exam


GI & Abdominal Exam: Normal Bowel Sounds, Soft.  absent: Tenderness





- Extremities Exam


Extremities exam: Positive for: normal inspection


Additional comments: 


AV fistula on left arm. Bruit heard, thrill palpable








- Back Exam


Back exam: NORMAL INSPECTION.  absent: CVA tenderness (L), CVA tenderness (R), 

paraspinal tenderness





- Neurological Exam


Neurological exam: Alert, CN II-XII Intact, Oriented x3





- Psychiatric Exam


Psychiatric exam: Normal Affect, Normal Mood





- Skin


Skin Exam: Dry, Intact, Normal Color, Warm





Results





- Vital Signs


Recent Vital Signs: 





                                Last Vital Signs











Temp  97.6 F   04/17/19 15:57


 


Pulse  73   04/17/19 15:57


 


Resp  19   04/17/19 15:57


 


BP  146/83   04/17/19 15:57


 


Pulse Ox  98   04/17/19 15:57














- Labs


Result Diagrams: 


                                 04/17/19 16:50





                                 04/17/19 16:50


Labs: 





                         Laboratory Results - last 24 hr











  04/17/19 04/17/19





  16:50 16:50


 


WBC  7.5 


 


RBC  3.56 


 


Hgb  10.9 L 


 


Hct  33.4 L 


 


MCV  93.8 


 


MCH  30.6 


 


MCHC  32.6 


 


RDW  15.5 H 


 


Plt Count  253 


 


MPV  9.2 


 


Neut % (Auto)  71.2 H 


 


Lymph % (Auto)  18.9 L 


 


Mono % (Auto)  3.9 


 


Eos % (Auto)  5.3 H 


 


Baso % (Auto)  0.7 


 


Lymph # (Auto)  1.4 


 


Mono # (Auto)  0.3 


 


Eos # (Auto)  0.4 


 


Baso # (Auto)  0.05 


 


Absolute Neuts (auto)  5.35 


 


Sodium   137


 


Potassium   5.6 H*


 


Chloride   98


 


Carbon Dioxide   25


 


Anion Gap   20


 


BUN   98 H


 


Creatinine   18.2 H*


 


Est GFR ( Amer)   3


 


Est GFR (Non-Af Amer)   3


 


Random Glucose   100


 


Calcium   9.1


 


Phosphorus   6.0 H


 


Magnesium   2.8 H


 


Total Bilirubin   0.4


 


AST   30


 


ALT   26


 


Alkaline Phosphatase   86


 


Total Protein   6.8


 


Albumin   4.2


 


Globulin   2.6


 


Albumin/Globulin Ratio   1.6














Assessment & Plan





- Assessment and Plan (Free Text)


Assessment: 


Patient is a 44 year old male with PMH of HTN and ESRD (M/W/F dialysis) who 

presenting with fatigue after missing hemodialysis.





Plan: 


ESRD on HD (M/W/F)


- Patient missed dialysis on Monday


- Patient had emergent dialysis today


- Plan for another dialysis session tomorrow


- Nephrology consulted, Dr. Edwards


- Continue home medication Phoslo, Torsemide


- Renal diet





Hypertension


- Continue home Norvasc, Clonidine


- Continue to monitor





Disposition: Patient Left AMA on 4/17





Patient seen and case discussed with attending, Dr. Lucero.





Aram Antony, PGY-1








<Anabelle Lucero - Last Filed: 04/18/19 07:52>





Results





- Vital Signs


Recent Vital Signs: 





                                Last Vital Signs











Temp  97.6 F   04/17/19 15:57


 


Pulse  73   04/17/19 15:57


 


Resp  19   04/17/19 15:57


 


BP  146/83   04/17/19 15:57


 


Pulse Ox  98   04/17/19 15:57














- Labs


Result Diagrams: 


                                 04/17/19 16:50





                                 04/17/19 16:50


Labs: 





                         Laboratory Results - last 24 hr











  04/17/19 04/17/19





  16:50 16:50


 


WBC  7.5 


 


RBC  3.56 


 


Hgb  10.9 L 


 


Hct  33.4 L 


 


MCV  93.8 


 


MCH  30.6 


 


MCHC  32.6 


 


RDW  15.5 H 


 


Plt Count  253 


 


MPV  9.2 


 


Neut % (Auto)  71.2 H 


 


Lymph % (Auto)  18.9 L 


 


Mono % (Auto)  3.9 


 


Eos % (Auto)  5.3 H 


 


Baso % (Auto)  0.7 


 


Lymph # (Auto)  1.4 


 


Mono # (Auto)  0.3 


 


Eos # (Auto)  0.4 


 


Baso # (Auto)  0.05 


 


Absolute Neuts (auto)  5.35 


 


Sodium   137


 


Potassium   5.6 H*


 


Chloride   98


 


Carbon Dioxide   25


 


Anion Gap   20


 


BUN   98 H


 


Creatinine   18.2 H*


 


Est GFR ( Amer)   3


 


Est GFR (Non-Af Amer)   3


 


Random Glucose   100


 


Calcium   9.1


 


Phosphorus   6.0 H


 


Magnesium   2.8 H


 


Total Bilirubin   0.4


 


AST   30


 


ALT   26


 


Alkaline Phosphatase   86


 


Total Protein   6.8


 


Albumin   4.2


 


Globulin   2.6


 


Albumin/Globulin Ratio   1.6














Attending/Attestation





- Attestation


I have personally seen and examined this patient.: Yes


I have fully participated in the care of the patient.: Yes


I have reviewed all pertinent clinical information: Yes


Notes (Text): 





04/18/19 07:46





Medical record note made by the resident after discussion with my direction and 

input after the patient was personally seen and examined by me. I have reviewed 

the chart and agree that the record accurately reflects by personal performance 

of the history, physical exam, data review, and medical decision-making, in the 

course for the patient. I have also personally directed the plan of care.





44 yrs old male with PMH of ESRD on HD M/W/F,HTN, non compliance with dialysis 

was admitted as he claims that his AAV graft is not working and since last 

Friday he did not has any dialysis.The Friday dialysis was also not completed as

AV graft was not working.


He has mild hyperkalemia, no evidence of fluid overload .


Case was discussed with Nephrology, plan for HD today, if graft will not work, 

will get vascular surgery evaluation.





Management plan was discussed in detail with patient. Education was provided

## 2019-04-18 NOTE — CON
DATE:  04/17/2019



LOCATION:  [facility].



NEPHROLOGY CONSULTATION



HISTORY OF PRESENT ILLNESS:  The patient is a 44-year-old male with past

medical history of hypertension and ESRD, on hemodialysis (since the past

10 months, currently Monday, Wednesday, Friday at [facility]

[facility] under our outpatient service) presented to ED with complaint of

fistula malfunction, Nephrology being consulted for ESRD care.  The patient

has a history of irregular compliance to dialysis regimen; last

hemodialysis session was 5 days ago, the patient missing dialysis treatment

2 days ago and today.  The patient was having some issues with his AV graft

with some difficulty in cannulating and with inadequate clearances,

possibly suggestive of underlying stenosis; nevertheless, the patient has

been having adequate blood flows on dialysis; the patient currently denies

any shortness of breath, nausea, vomiting, chest pain, palpitations.  The

patient still makes adequate amount of urine, does acknowledge leg

swelling.



PAST MEDICAL HISTORY:  As above.



SOCIAL HISTORY:  Prior cigarette use.



FAMILY HISTORY:  CKD.



REVIEW OF SYSTEMS:

CONSTITUTIONAL:  No decreased appetite.

HEENT:  No dysphagia.

RESPIRATORY:  No dyspnea.

CARDIOVASCULAR:  No chest pain or palpitations.

GI:  No nausea, vomiting, or diarrhea.

:  No dysuria.

MUSCULOSKELETAL:  Has chronic knee pain, but denies taking any pain meds.

SKIN:  Reports some pruritus over his shins.

NEURO:  Denies any numbness in his feet.  No dizziness.



VITAL SIGNS:  This afternoon, blood pressure 146/83, heart rate 73,

respirations 19, temperature 97.6, O2 sat 98% on room air.



PHYSICAL EXAMINATION:

GENERAL:  No distress, conversing coherently in full sentences.

HEENT:  Moist mucous membranes.  Nonicteric.  No cervical _____

lymphadenopathy.  No JVD elevation.

RESPIRATORY:  Lungs are clear to auscultation bilaterally.  No rales, no

rhonchi, no wheezes.

CARDIOVASCULAR:  Heart sounds S1, S2 normal.  No murmurs, no gallops.  No

rubs.

GI:  Abdomen soft, nontender, nondistended.

:  No bladder distention.

EXTREMITIES:  2+ bilateral lower leg edema.

SKIN:  Warm, no cyanosis.  Left arm AV graft with good bruit.

PSYCHIATRIC:  Normal mood, normal affect.

NEURO:  No resting tremor.



LABORATORY DATA:  CBC:  WBC 7.5, hemoglobin 10.9, hematocrit 33.4,

platelets 253.  Chemistry panel:  Sodium 137, potassium 5.6, chloride 98,

bicarb 25, BUN 98, creatinine 18.2, glucose 100, calcium 9.1, phosphorus 6,

magnesium 2.8, AST 30, ALT 26, albumin 4.2.  Chest x-ray directly

visualized, lungs were clear.



ASSESSMENT AND PLAN:

1.  End stage renal disease, on hemodialysis.  The patient with modest

hyperkalemia today after missing two dialysis treatments; has increased

edema, otherwise no evidence of volume overload; dialyzing today urgently

over 2 hours with 2 liters ultrafiltration goal.

2.  Hypertensive chronic kidney disease/end stage renal disease, blood

pressure elevated.  The patient reports taking his medications regularly;

however, probably has a volume excess component that is driving his

hypertension, will continue home medications as ordered.

3.  Chronic kidney disease mineral bone disorder.  The patient with

hyperphosphatemia, will continue on PhosLo 2 tablets with each meal.

4.  Anemia of chronic kidney disease.  Hemoglobin on goal of 10-11 g.  We

will dose Epogen as outpatient per protocol.



The patient counseled extensively on need for regular adherence to dialysis

regimen and to follow instructions for ESRD care; discussed that a

transplant will not be an option if the patient has major compliance

issues.  The patient states that he understands our concerns and will try

to adhere to our advice.



Thank you for this referral.  We will be following closely.





__________________________________________

Josiah Edwards MD







DD:  04/17/2019 21:02:56

DT:  04/17/2019 21:07:17

Job # [id number]
